# Patient Record
Sex: FEMALE | Race: WHITE | NOT HISPANIC OR LATINO | Employment: OTHER | URBAN - METROPOLITAN AREA
[De-identification: names, ages, dates, MRNs, and addresses within clinical notes are randomized per-mention and may not be internally consistent; named-entity substitution may affect disease eponyms.]

---

## 2019-02-05 ENCOUNTER — APPOINTMENT (OUTPATIENT)
Dept: RADIOLOGY | Facility: CLINIC | Age: 69
End: 2019-02-05
Payer: MEDICARE

## 2019-02-05 ENCOUNTER — OFFICE VISIT (OUTPATIENT)
Dept: OBGYN CLINIC | Facility: CLINIC | Age: 69
End: 2019-02-05
Payer: MEDICARE

## 2019-02-05 VITALS
HEART RATE: 49 BPM | DIASTOLIC BLOOD PRESSURE: 72 MMHG | HEIGHT: 65 IN | BODY MASS INDEX: 27.66 KG/M2 | SYSTOLIC BLOOD PRESSURE: 121 MMHG | WEIGHT: 166 LBS

## 2019-02-05 DIAGNOSIS — M25.511 BILATERAL SHOULDER PAIN, UNSPECIFIED CHRONICITY: ICD-10-CM

## 2019-02-05 DIAGNOSIS — M25.512 BILATERAL SHOULDER PAIN, UNSPECIFIED CHRONICITY: ICD-10-CM

## 2019-02-05 DIAGNOSIS — M75.111 NONTRAUMATIC PARTIAL BILATERAL ROTATOR CUFF TEAR: ICD-10-CM

## 2019-02-05 DIAGNOSIS — M19.012 OSTEOARTHRITIS OF BILATERAL GLENOHUMERAL JOINTS: Primary | ICD-10-CM

## 2019-02-05 DIAGNOSIS — M19.011 OSTEOARTHRITIS OF BILATERAL GLENOHUMERAL JOINTS: Primary | ICD-10-CM

## 2019-02-05 DIAGNOSIS — M75.112 NONTRAUMATIC PARTIAL BILATERAL ROTATOR CUFF TEAR: ICD-10-CM

## 2019-02-05 PROCEDURE — 99214 OFFICE O/P EST MOD 30 MIN: CPT | Performed by: ORTHOPAEDIC SURGERY

## 2019-02-05 PROCEDURE — 73030 X-RAY EXAM OF SHOULDER: CPT

## 2019-02-05 RX ORDER — CALCIUM CARBONATE 500(1250)
TABLET ORAL
COMMUNITY

## 2019-02-05 RX ORDER — ATORVASTATIN CALCIUM 20 MG/1
20 TABLET, FILM COATED ORAL DAILY
COMMUNITY

## 2019-02-05 RX ORDER — LANOLIN ALCOHOL/MO/W.PET/CERES
CREAM (GRAM) TOPICAL
COMMUNITY

## 2019-02-05 NOTE — PROGRESS NOTES
Assessment/Plan:  1  Osteoarthritis of bilateral glenohumeral joints  Ambulatory referral to Physical Therapy   2  Nontraumatic partial bilateral rotator cuff tear  Ambulatory referral to Physical Therapy   3  Bilateral shoulder pain, unspecified chronicity  XR shoulder 2+ vw left    XR shoulder 2+ vw right       Scribe Attestation    I,:   Brenda Bueno am acting as a scribe while in the presence of the attending physician :        I,:   Ileana Schmidt MD personally performed the services described in this documentation    as scribed in my presence :          Jamie upon examination and review of x-rays today does give me strong suspicion that she may have bilateral partial tears of the rotator cuffs  The x-rays today does demonstrate moderate glenohumeral joint osteoarthritis with osteophyte formation inferior aspect of the humeral heads on both sides  She does demonstrate restrictions in range of motion consistent with the findings on the x-ray with internal and external rotation  Additionally she demonstrates weakness in abduction indicating a rotator cuff pathology  I did note to her that I would like her to continue with physical therapy to continue a increase in range of motion and strength  I did note to her that should she fail conservative measures down the road she may consider a shoulder replacement procedure  However, I do not feel that she is a candidate for this at now as she does not demonstrate satisfactory dysfunction in her shoulders to warrant this procedure  I would like to see Jamie back in 6 weeks for repeat evaluation  Subjective:   Flako Otero is a 76 y o  female who presents for follow-up evaluation of her left shoulder does also present for evaluation for right shoulder  She states that this pain has been ongoing for approximately 2 months  She notes that she does have a history of surgery into the left shoulder in which she alleges to be in 2013  [facility]   She also notes that she has been participating in physical therapy with no alleviation of her painful symptoms  She does note that she had an MRI many years ago that did demonstrate a partial tear to her rotator cuff on the left side  She states that her pain is exacerbated with overhead activities as well as reaching behind her back  She notes the pain as an intermittent and moderate sharp pain about the anterior lateral aspect of her shoulders  She notes that the pain is better at rest however will experience a mild aching  She does have a history of falls that have originally initiated her painful symptoms in her shoulders many years ago however denies any recent falls  Today she denies any distal paresthesias  Review of Systems   Constitutional: Negative for chills, fever and unexpected weight change  HENT: Negative for hearing loss, nosebleeds and sore throat  Eyes: Positive for pain  Negative for redness and visual disturbance  Respiratory: Negative for cough, shortness of breath and wheezing  Cardiovascular: Negative for chest pain, palpitations and leg swelling  Gastrointestinal: Negative for abdominal pain, nausea and vomiting  Endocrine: Negative for polydipsia and polyuria  Genitourinary: Negative for dysuria and hematuria  Musculoskeletal: Positive for myalgias  See HPI   Skin: Negative for rash and wound  Neurological: Positive for dizziness and numbness  Negative for headaches  Psychiatric/Behavioral: Negative for decreased concentration and suicidal ideas  The patient is not nervous/anxious  No past medical history on file  Past Surgical History:   Procedure Laterality Date    SHOULDER SURGERY         Family History   Problem Relation Age of Onset    Breast cancer Mother     Alzheimer's disease Father     Lung cancer Sister        Social History     Occupational History    Not on file       Social History Main Topics    Smoking status: Never Smoker    Smokeless tobacco: Never Used    Alcohol use No    Drug use: No    Sexual activity: Not on file         Current Outpatient Prescriptions:     atorvastatin (LIPITOR) 20 mg tablet, Take 20 mg by mouth daily, Disp: , Rfl:     Calcium 500 MG tablet, Take by mouth, Disp: , Rfl:     cholecalciferol (VITAMIN D3) 1,000 units tablet, Take by mouth, Disp: , Rfl:     cyanocobalamin (VITAMIN B-12) 1,000 mcg tablet, Take by mouth, Disp: , Rfl:     Multiple Vitamin (MULTI-VITAMIN) tablet, Take by mouth, Disp: , Rfl:     No Known Allergies    Objective:  Vitals:    02/05/19 1116   BP: 121/72   Pulse: (!) 49       Right Shoulder Exam     Tenderness   Right shoulder tenderness location: anterior aspect  Range of Motion   Active Abduction: 130   Passive Abduction: 140   External Rotation: 50   Internal Rotation 0 degrees: Sacrum     Muscle Strength   Abduction: 4/5   Internal Rotation: 5/5   External Rotation: 5/5     Tests   Hawkin's test: positive    Other   Erythema: absent  Scars: absent  Sensation: normal  Pulse: present      Left Shoulder Exam     Tenderness   Left shoulder tenderness location: anterior aspect  Range of Motion   Active Abduction: 130   Passive Abduction: 140   External Rotation: 50   Internal Rotation 0 degrees: Sacrum     Muscle Strength   Abduction: 4/5   Internal Rotation: 5/5   External Rotation: 4/5     Tests   Hawkin's test: positive    Other   Erythema: absent  Scars: absent  Sensation: normal  Pulse: present             Physical Exam   Constitutional: She is oriented to person, place, and time  She appears well-developed and well-nourished  HENT:   Head: Normocephalic and atraumatic  Eyes: Conjunctivae are normal  Right eye exhibits no discharge  Left eye exhibits no discharge  Neck: Normal range of motion  Neck supple  Cardiovascular: Normal rate and intact distal pulses  Pulmonary/Chest: Effort normal  No respiratory distress     Musculoskeletal:   As noted in HPI   Neurological: She is alert and oriented to person, place, and time  Skin: Skin is warm and dry  Psychiatric: She has a normal mood and affect  Her behavior is normal  Judgment and thought content normal    Nursing note and vitals reviewed  I have personally reviewed pertinent films in PACS and my interpretation is as follows:    X-rays of the right and left shoulders demonstrates moderate glenohumeral osteoarthritis with osteophyte formation of the inferior aspect of the left and right humeral heads

## 2019-03-26 ENCOUNTER — OFFICE VISIT (OUTPATIENT)
Dept: OBGYN CLINIC | Facility: CLINIC | Age: 69
End: 2019-03-26
Payer: MEDICARE

## 2019-03-26 VITALS
DIASTOLIC BLOOD PRESSURE: 71 MMHG | HEART RATE: 50 BPM | HEIGHT: 65 IN | WEIGHT: 169 LBS | BODY MASS INDEX: 28.16 KG/M2 | SYSTOLIC BLOOD PRESSURE: 114 MMHG

## 2019-03-26 DIAGNOSIS — M75.111 NONTRAUMATIC PARTIAL BILATERAL ROTATOR CUFF TEAR: ICD-10-CM

## 2019-03-26 DIAGNOSIS — M75.112 NONTRAUMATIC PARTIAL BILATERAL ROTATOR CUFF TEAR: ICD-10-CM

## 2019-03-26 DIAGNOSIS — M25.511 BILATERAL SHOULDER PAIN, UNSPECIFIED CHRONICITY: ICD-10-CM

## 2019-03-26 DIAGNOSIS — M19.012 OSTEOARTHRITIS OF GLENOHUMERAL JOINT, LEFT: ICD-10-CM

## 2019-03-26 DIAGNOSIS — M25.512 BILATERAL SHOULDER PAIN, UNSPECIFIED CHRONICITY: ICD-10-CM

## 2019-03-26 DIAGNOSIS — M19.011 OSTEOARTHRITIS OF GLENOHUMERAL JOINT, RIGHT: Primary | ICD-10-CM

## 2019-03-26 PROCEDURE — 99214 OFFICE O/P EST MOD 30 MIN: CPT | Performed by: ORTHOPAEDIC SURGERY

## 2019-03-26 PROCEDURE — 20610 DRAIN/INJ JOINT/BURSA W/O US: CPT | Performed by: ORTHOPAEDIC SURGERY

## 2019-03-26 RX ORDER — FLUTICASONE PROPIONATE 50 MCG
SPRAY, SUSPENSION (ML) NASAL
COMMUNITY
Start: 2018-12-24

## 2019-03-26 RX ORDER — BUPIVACAINE HYDROCHLORIDE 2.5 MG/ML
4 INJECTION, SOLUTION INFILTRATION; PERINEURAL
Status: COMPLETED | OUTPATIENT
Start: 2019-03-26 | End: 2019-03-26

## 2019-03-26 RX ORDER — DEXAMETHASONE SODIUM PHOSPHATE 100 MG/10ML
40 INJECTION INTRAMUSCULAR; INTRAVENOUS
Status: COMPLETED | OUTPATIENT
Start: 2019-03-26 | End: 2019-03-26

## 2019-03-26 RX ADMIN — DEXAMETHASONE SODIUM PHOSPHATE 40 MG: 100 INJECTION INTRAMUSCULAR; INTRAVENOUS at 12:12

## 2019-03-26 RX ADMIN — BUPIVACAINE HYDROCHLORIDE 4 ML: 2.5 INJECTION, SOLUTION INFILTRATION; PERINEURAL at 12:12

## 2019-03-26 NOTE — PROGRESS NOTES
Assessment/Plan:  1  Osteoarthritis of glenohumeral joint, right  Large joint arthrocentesis: R glenohumeral   2  Osteoarthritis of glenohumeral joint, left     3  Nontraumatic partial bilateral rotator cuff tear     4  Bilateral shoulder pain, unspecified chronicity         Scribe Attestation    I,:   Pankaj Jin am acting as a scribe while in the presence of the attending physician :        I,:   America Dickinson MD personally performed the services described in this documentation    as scribed in my presence :              Luis Carlos Mention upon examination and review of her x-rays from last visit her continues to struggle with painful symptoms associated with glenohumeral joint osteoarthritis as well as possible tears to the rotator cuffs bilaterally  She does demonstrate reasonable range of motion however is very sore with abduction  As well as reasonable strength with testing of the rotator cuffs  She does demonstrate pain with strength testing of the supraspinatus tendons bilaterally  Luis Carlos Acevedo has tried corticosteroid injections into her shoulders as of yet  I did offer her this today as a reasonable next step with the hopes of alleviating some of her painful symptoms  Luis Carlos Mention was a green will to a corticosteroid injection to the right glenohumeral joint today  I did provide this to her today, and she tolerated this well with no complications  She may participate in physical therapy today however should focus on the left shoulder versus the right shoulder  I did recommend her to ice her right shoulder tonight due to soreness from the injection  She may also finish her prescription of physical therapy to the end of March and is to continue with the exercises provided to her from physical therapy at home  Should Luis Carlos Acevedo get good relief from the corticosteroid injection into the right shoulder she may follow up with me and we may consider corticosteroid injection for the left shoulder    However should she fail to see improvements we may consider an MRI of the more symptomatic shoulder prior to undergoing a surgical procedure such as a rotator cuff repair versus shoulder replacement  I will see Venus Nj back on as-needed basis  Large joint arthrocentesis: R glenohumeral  Date/Time: 3/26/2019 12:12 PM  Consent given by: patient  Supporting Documentation  Indications: pain   Procedure Details  Location: shoulder - R glenohumeral  Needle size: 22 G  Ultrasound guidance: no  Approach: anterior  Medications administered: 4 mL bupivacaine 0 25 %; 40 mg dexamethasone 100 mg/10 mL    Patient tolerance: patient tolerated the procedure well with no immediate complications  Dressing:  Sterile dressing applied          Subjective:   Keya Thompson is a 76 y o  female who presents to the office today for follow-up evaluation of both her left and right shoulders  She states that she is still experiencing moderate to severe radiating sharp pain globally about the shoulders  She is more symptomatic on the right shoulder versus the left today  She states that overhead activities as well as weight-bearing activities exacerbates her painful symptoms  She has been participating in physical therapy and states that she has not noticed any significant relief or improvement in range of motion  She does note that her pain is slightly better while at rest however will still experience and constant ache  She notes that she is still able to function throughout her day however is still painful  Today she denies any distal paresthesias  Review of Systems   Constitutional: Negative for chills, fever and unexpected weight change  HENT: Negative for hearing loss, nosebleeds and sore throat  Eyes: Negative for pain, redness and visual disturbance  Respiratory: Negative for cough, shortness of breath and wheezing  Cardiovascular: Negative for chest pain, palpitations and leg swelling     Gastrointestinal: Negative for abdominal pain, nausea and vomiting  Endocrine: Negative for polydipsia and polyuria  Genitourinary: Negative for dysuria and hematuria  Musculoskeletal: Positive for arthralgias and myalgias  See HPI   Skin: Negative for rash and wound  Neurological: Negative for dizziness, numbness and headaches  Psychiatric/Behavioral: Negative for decreased concentration and suicidal ideas  The patient is not nervous/anxious  History reviewed  No pertinent past medical history  Past Surgical History:   Procedure Laterality Date    SHOULDER SURGERY         Family History   Problem Relation Age of Onset    Breast cancer Mother     Alzheimer's disease Father     Lung cancer Sister        Social History     Occupational History    Not on file   Tobacco Use    Smoking status: Never Smoker    Smokeless tobacco: Never Used   Substance and Sexual Activity    Alcohol use: No    Drug use: No    Sexual activity: Not on file         Current Outpatient Medications:     atorvastatin (LIPITOR) 20 mg tablet, Take 20 mg by mouth daily, Disp: , Rfl:     Calcium 500 MG tablet, Take by mouth, Disp: , Rfl:     cholecalciferol (VITAMIN D3) 1,000 units tablet, Take by mouth, Disp: , Rfl:     cyanocobalamin (VITAMIN B-12) 1,000 mcg tablet, Take by mouth, Disp: , Rfl:     fluticasone (FLONASE) 50 mcg/act nasal spray, , Disp: , Rfl:     Multiple Vitamin (MULTI-VITAMIN) tablet, Take by mouth, Disp: , Rfl:     No Known Allergies    Objective:  Vitals:    03/26/19 1137   BP: 114/71   Pulse: (!) 50       Right Shoulder Exam     Tenderness   The patient is experiencing no tenderness  Range of Motion   Active abduction: 150   Passive abduction: 160   Extension: 60   Right shoulder external rotation: 45     Internal rotation 0 degrees: Sacrum   Internal rotation 90 degrees: 10     Muscle Strength   Abduction: 4/5   Internal rotation: 5/5   External rotation: 5/5     Tests   Short test: positive  Impingement: positive    Other Erythema: absent  Scars: absent  Sensation: normal  Pulse: present    Comments:  Empty can test:  Positive      Left Shoulder Exam     Tenderness   The patient is experiencing no tenderness  Range of Motion   Active abduction: 150   Passive abduction: 160   Internal rotation 0 degrees: Sacrum   Internal rotation 90 degrees: 20     Muscle Strength   Abduction: 4/5   Internal rotation: 5/5   External rotation: 5/5     Tests   Short test: positive  Impingement: positive    Other   Erythema: absent  Scars: absent  Sensation: normal  Pulse: present     Comments:  Empty can test:  Positive            Physical Exam   Constitutional: She is oriented to person, place, and time  She appears well-developed and well-nourished  HENT:   Head: Normocephalic and atraumatic  Eyes: Conjunctivae are normal  Right eye exhibits no discharge  Left eye exhibits no discharge  Neck: Normal range of motion  Neck supple  Cardiovascular: Normal rate and intact distal pulses  Pulmonary/Chest: Effort normal  No respiratory distress  Musculoskeletal:   As noted in HPI   Neurological: She is alert and oriented to person, place, and time  Skin: Skin is warm and dry  Psychiatric: She has a normal mood and affect  Her behavior is normal  Judgment and thought content normal    Vitals reviewed          I have personally reviewed pertinent films in PACS and my interpretation is as follows:    X-rays of the left and right shoulder does demonstrate moderate glenohumeral joint osteoarthritis with osteophytosis at the inferior aspect of the humeral heads